# Patient Record
Sex: MALE | Employment: FULL TIME | URBAN - METROPOLITAN AREA
[De-identification: names, ages, dates, MRNs, and addresses within clinical notes are randomized per-mention and may not be internally consistent; named-entity substitution may affect disease eponyms.]

---

## 2019-09-13 ENCOUNTER — TELEPHONE (OUTPATIENT)
Dept: ORTHOPEDICS | Facility: CLINIC | Age: 64
End: 2019-09-13

## 2019-09-13 NOTE — TELEPHONE ENCOUNTER
[facility]    Phone Message    May a detailed message be left on voicemail: yes    Reason for Call: Other: Tommy is a new tumor Pt being referred from [facility] for a Rt Hip Mass. Scheduled for 09/18 first available appt I called the priority line for earlier appt no answer, records coming in, however Pt would like a call back to discuss if the biopsy will take place on 09/18. Please reach out to discuss.     Action Taken: Message routed to:  [facility] ([facility] Ortho

## 2019-09-16 ENCOUNTER — DOCUMENTATION ONLY (OUTPATIENT)
Dept: ORTHOPEDICS | Facility: CLINIC | Age: 64
End: 2019-09-16

## 2019-09-16 NOTE — TELEPHONE ENCOUNTER
Left a msg stating sometimes we do biopsies in clinic, which allow the pt to walk and drive after, sometimes we do surgical biopsies, which are scheduled for a later date.  I do not have any imaging at this time, so we will know more when we see him this week.  He can call with questions.

## 2019-09-16 NOTE — PROGRESS NOTES
I received a fax and records indicating patient to be seen for right hip soft tissue mass.  I left message for patient and looks like he call the appointment line.  Records have been sent to clinic Dr. Hung's nurse and ATC.

## 2019-09-18 ENCOUNTER — DOCUMENTATION ONLY (OUTPATIENT)
Dept: CARE COORDINATION | Facility: CLINIC | Age: 64
End: 2019-09-18

## 2019-09-18 ENCOUNTER — OFFICE VISIT (OUTPATIENT)
Dept: ORTHOPEDICS | Facility: CLINIC | Age: 64
End: 2019-09-18
Payer: COMMERCIAL

## 2019-09-18 VITALS — WEIGHT: 187 LBS | BODY MASS INDEX: 25.33 KG/M2 | HEIGHT: 72 IN

## 2019-09-18 DIAGNOSIS — S70.01XA HEMATOMA OF RIGHT HIP, INITIAL ENCOUNTER: Primary | ICD-10-CM

## 2019-09-18 RX ORDER — SILDENAFIL CITRATE 20 MG/1
TABLET ORAL
COMMUNITY
Start: 2017-05-23

## 2019-09-18 RX ORDER — PAROXETINE 30 MG/1
30 TABLET, FILM COATED ORAL
COMMUNITY
Start: 2019-07-24

## 2019-09-18 RX ORDER — IBUPROFEN 200 MG
400 TABLET ORAL
COMMUNITY
Start: 2014-08-14

## 2019-09-18 RX ORDER — LEVOTHYROXINE SODIUM 100 UG/1
100 TABLET ORAL
COMMUNITY
Start: 2018-11-01

## 2019-09-18 ASSESSMENT — ENCOUNTER SYMPTOMS
MYALGIAS: 1
BACK PAIN: 1
ARTHRALGIAS: 1
NECK PAIN: 1
MUSCLE CRAMPS: 0
STIFFNESS: 0
JOINT SWELLING: 1
MUSCLE WEAKNESS: 1

## 2019-09-18 ASSESSMENT — MIFFLIN-ST. JEOR: SCORE: 1680.74

## 2019-09-18 NOTE — LETTER
9/18/2019       RE: Tomym Hdez  [id number] [facility] [id number]     Dear Colleague,    Thank you for referring your patient, Tommy Hdez, to the [facility] at [facility]. Please see a copy of my visit note below.    This patient has a right flank mass that used to be much bigger.  He first noticed it 7 months ago.  He did have some falls on the ice prior to this during the winter months.  The mass was new in February.  It has fluctuated in size a little bit but in general it is a lot smaller than it used to be.  I reviewed his patient survey information in the EMR.  His main complaint is a sense of weakness in the right leg.  This is fairly subtle as he is in person.    On examination he is alert oriented has normal mood and distress.  Respirations are regular and unlabored eyes are nonicteric.  In his right hip area he has fullness but some minimally tender.  He has a normal gait and normal motion of the hip knee and ankle on the right side.      I reviewed his imaging studies.  This lesion is most consistent with a hematoma.  It is fairly classic for location and signal being brighter than muscle on the T1 with a thick fibrous rim.    We discussed excision.  This would involve removing that fibrin pseudocapsule and creating a large soft tissue defect.  I would want to leave an indwelling drain for at least a week.  There is a risk of reaccumulation of fluid in this area and the muscle pain and weakness.  Right now we will send him to physical therapy to work on his hip strength and if he still has discomfort in this area then he will call and we will do surgery after that.    Again, thank you for allowing me to participate in the care of your patient.      Sincerely,    Shabbir Hung MD

## 2019-09-18 NOTE — NURSING NOTE
"Reason For Visit:   Chief Complaint   Patient presents with     Consult     right hip mass       Ht 1.82 m (5' 11.65\")   Wt 84.8 kg (187 lb)   BMI 25.61 kg/m      Pain Assessment  Patient Currently in Pain: Yes  0-10 Pain Scale: 2  Primary Pain Location: Hip(right)  Pain Descriptors: Dull    [place] Maria Teresa, ATC    "

## 2019-09-18 NOTE — PROGRESS NOTES
This patient has a right flank mass that used to be much bigger.  He first noticed it 7 months ago.  He did have some falls on the ice prior to this during the winter months.  The mass was new in February.  It has fluctuated in size a little bit but in general it is a lot smaller than it used to be.  I reviewed his patient survey information in the EMR.  His main complaint is a sense of weakness in the right leg.  This is fairly subtle as he is in person.    On examination he is alert oriented has normal mood and distress.  Respirations are regular and unlabored eyes are nonicteric.  In his right hip area he has fullness but some minimally tender.  He has a normal gait and normal motion of the hip knee and ankle on the right side.      I reviewed his imaging studies.  This lesion is most consistent with a hematoma.  It is fairly classic for location and signal being brighter than muscle on the T1 with a thick fibrous rim.    We discussed excision.  This would involve removing that fibrin pseudocapsule and creating a large soft tissue defect.  I would want to leave an indwelling drain for at least a week.  There is a risk of reaccumulation of fluid in this area and the muscle pain and weakness.  Right now we will send him to physical therapy to work on his hip strength and if he still has discomfort in this area then he will call and we will do surgery after that.

## 2019-10-09 ENCOUNTER — TRANSFERRED RECORDS (OUTPATIENT)
Dept: HEALTH INFORMATION MANAGEMENT | Facility: CLINIC | Age: 64
End: 2019-10-09